# Patient Record
(demographics unavailable — no encounter records)

---

## 2024-12-26 NOTE — HISTORY OF PRESENT ILLNESS
[FreeTextEntry1] : --52 Y/O  HERE FOR CHECK UP. PMHX; denies SOCIAL;-ETOH   -CIGG       x STD;    HPV          DISCUSSED CONDOMS x partner recently dec- due to htn- stroke FAMILY HX OF BREAST CANCER,OVARIAN,UTERINE CA: mother /w breast ca age 67- being rx now REVIEW OF SYMPTOMS DONE ALLERGIES; Patient has answered amoxicillin - rash Medication reconciliation was completed by reviewing, with the patient's involvement, the patient's current outpatient medications and those ordered for the patient today. none  PE;BREASTS;- MASSES DC NODES SBE IMPLANTS B/L ABD SOFT NT ND NL GENIT   VAGINA -DC CX -CMT UTERUS 6 WEEKS  SIZE NT ADNEXA NT -MASSES'  A;P;CHECK UP -PAP -MRI BREASTS -F-U PRN.

## 2025-01-21 NOTE — ASSESSMENT
[FreeTextEntry1] : 1. Chronic leukopenia and neutropenia. 2. Mild anemia.  Assessment and Plan: -- CBC from today reviewed. WBC 4.07, ANC 1.38, Hgb 12.0, MCV 91.4, PLT 269K -- Long history of chronic leukopenia and neutropenia, most likely representing a benign process. She is asymptomatic and has no sign of frequent infection. Will continue observation. -- Continue taking oral iron supplement every other day. Reviewed iron studies from 03/2024 Iron sat 23%, iron 78, ferritin 45. -- Continue to take Vitamin B12 1000 mcg SL three times a week. -- Will order repeat blood work: CBC, Fe, TIBC, Ferritin, B12 and Folate -- Follow up with PCP, Dr. Plascencia for other healthcare issues. -- Follow up with Dr. Chow GYN as schd.  -- RTO for followup in 6 months with repeat labs: CBC, Fe, TIBC, Ferritin, B12 and Folate..  Pt seen with Dr. Gonsalez

## 2025-01-21 NOTE — PHYSICAL EXAM
[Fully active, able to carry on all pre-disease performance without restriction] : Status 0 - Fully active, able to carry on all pre-disease performance without restriction [Normal] : affect appropriate [de-identified] : bilateral breast implants

## 2025-01-21 NOTE — HISTORY OF PRESENT ILLNESS
[de-identified] : 49 yo female is referred for evaluation of chronic leukopenia and neutropenia. She was seen by me in the past for this condition. Extensive work up was done. BM biopsy did not reveal significant finding. TCR and Ig gene rearrangement did not show T-cell and B-cell clonality. Her Hb and PLT were normal. She was asymptomatic. She lost followup for a few years. On 11/15/19, she had blood work. CBC showed WBC 2.53, Hb 10.7 and , MCV 93.9. She has been on vegan diet for 3 years and feels well. She complains cramping in her legs sometimes.  [de-identified] : 5/6/2020 Patient is here today for follow up visit for h/o chronic leukopenia and neutropenia.  She has been taking Ferrous Sulfate few months ago.  %sat=14%, Ferritin=10. She is recently undergoing a separation during this COVID pandemic and stressed.  Her menses have been irregular.  These past two months, bleeding x 15 days.  However, her hgb improved to 12.0 from 10.8 last visit. She has an appt with her GYN, Dr. Harrison Chow on 5/12 for ultrasound and possible biopsy. FSH=3.5, LH=6.0 done 2/20/2020. Denies any fever, cough, SOB. She started adding eggs twice a week to her vegan diet.  10/1/2020 Patient is here today for follow up visit for h/o chronic leukopenia and neutropenia.  She has been taking Ferrous Sulfate daily but ran out 2 weeks ago.  %sat=9%, Ferritin=29 on 5/9/2020.  She saw her GYN, Dr. Chow for hysteroscopy which was benign. LMP 9/14/2020 irregular.   1/7/2021 Patient is here today for follow up visit for h/o chronic leukopenia and neutropenia.  She also has iron deficiency anemia due to heavy periods. She has been taking Ferrous Sulfate every other day.  She saw her GYN, Dr. Chow for hysteroscopy which was benign. LMP 11//2020 which has been irregular this past year. Last colonoscopy was done ~2 yrs ago by Dr. Arriola as per pt- polyps found.  Previous labs reviewed.  04/08/2021 Patient is here today for follow up visit for chronic leukopenia and neutropenia. She also has iron deficiency anemia due to heavy periods, now regular and moderate. She has been taking Ferrous Sulfate every other day. In addition she is taking vitamin B12 SL three days per week. She had COVID 1 month ago, still experiencing headache and chills. Denies fever, SOB, CP. CBC reviewed today WBC 4.18, ANC 2.34, H/H 11.3/35.6, . She had MRI bilateral breast 3/21 no evidence of malignancy.   7/8/21: Patient is here today for follow up visit for chronic leukopenia and neutropenia. She also has iron deficiency anemia due to heavy periods. She has been taking Ferrous Sulfate every other day. In addition she is taking vitamin B12 SL three days per week. She had COVID in 3/2021, and was symptomatic with headache, fatigue and chills. She is finally feeling better now. She has lost weight. Denies fever, mouth sore, SOB, n/v/d.   2/10/22: Patient is here today for follow up visit for chronic leukopenia and neutropenia. She also has iron deficiency anemia due to heavy periods. She has been taking Ferrous Sulfate every other day. In addition she is taking vitamin B12 SL three days per week. She had COVID in 3/2021, and was symptomatic with headache, fatigue and chills. She has recovered well now.   6/16/22 Sara is here today for follow up visit for chronic leukopenia and neutropenia. She also has iron deficiency anemia due to heavy periods. She has been taking Ferrous Sulfate every other day. In addition she is taking vitamin B12 SL three days per week. She had COVID in 3/2021, and was symptomatic with headache, fatigue and chills. She has recovered well now. She had COVID vaccin x 2.   9/22/22: Sara is here today for follow up visit for chronic leukopenia and neutropenia. She also has iron deficiency anemia due to heavy periods. She has been taking Ferrous Sulfate every other day. In addition she is taking vitamin B12 SL three days per week. She had COVID in 3/2021, and was symptomatic with headache, fatigue and chills. She has recovered well now. She had COVID vaccin x 2. She responded to vaccination and good spike protein Ab titers. She does not have new complains today. Her son was diagnosed with thyroid cancer and will have surgery. She is very concerned.  1/5/2023 Sara is here today for follow up visit for chronic leukopenia and neutropenia. She also has iron deficiency anemia due to heavy periods. She has been taking Ferrous Sulfate every other day. In addition she is taking vitamin B12 SL three days per week. She had COVID vaccine x 2. She responded to vaccination and good spike protein Ab titers. She does not have new complaints today. Denies fever, chills, night sweats, weight loss, fatigue, palpations.   04/06/23 Sara is here today for follow up visit for chronic leukopenia and neutropenia. She also has iron deficiency anemia due to heavy periods. She has been taking Ferrous Sulfate every other day. In addition she is taking vitamin B12 SL three days per week. She had COVID vaccine x 2. She responded to vaccination and good spike protein Ab titers. She does not have new complaints today.  07/13/2023 Sara is here today for follow up visit for chronic leukopenia and neutropenia. She also has iron deficiency anemia due to heavy periods. Pt does not have her period anymore. She has been taking Ferrous Sulfate every other day. In addition she is taking vitamin B12 SL three days per week. She had COVID vaccine x 2. She responded to vaccination and good spike protein Ab titers. She does not have new complaints today.  11/9/23 Sara is here today for follow up visit for chronic leukopenia and neutropenia. She also has iron deficiency anemia due to heavy periods. She no longer gets her period. She has been taking Ferrous Sulfate every other day. In addition she is taking vitamin B12 SL three days per week. She denies fever, chills, night sweats, weight loss.  3/14/24 Sara is here today for follow up visit for chronic leukopenia and neutropenia. She also has iron deficiency anemia due to heavy periods. She no longer gets her period. She has been taking Ferrous Sulfate every other day. In addition she is taking vitamin B12 SL three days per week. She denies fever, chills, night sweats, weight loss. She does endorse new right axilla nodule x 1 month, tender to palpation.   1/16/2025 Sara is here today for follow up visit for chronic leukopenia and neutropenia. She also has iron deficiency anemia due to heavy periods. Pt is now menopause LMP: 03/2022. She has been taking Ferrous Sulfate every other day. In addition, she is taking vitamin B12 SL three days per week. She denies fever, chills, night sweats, weight loss. She did endorse new right axilla nodule, which was tender to palpation 2/2024. s/p Breast MRI 3/2024 done by Dr. Chow benign per pt. Pt has a new script for breast MRI in 3/2025 Pt denies any new complaints today.

## 2025-01-21 NOTE — PHYSICAL EXAM
[Fully active, able to carry on all pre-disease performance without restriction] : Status 0 - Fully active, able to carry on all pre-disease performance without restriction [Normal] : affect appropriate [de-identified] : bilateral breast implants

## 2025-01-21 NOTE — HISTORY OF PRESENT ILLNESS
[de-identified] : 47 yo female is referred for evaluation of chronic leukopenia and neutropenia. She was seen by me in the past for this condition. Extensive work up was done. BM biopsy did not reveal significant finding. TCR and Ig gene rearrangement did not show T-cell and B-cell clonality. Her Hb and PLT were normal. She was asymptomatic. She lost followup for a few years. On 11/15/19, she had blood work. CBC showed WBC 2.53, Hb 10.7 and , MCV 93.9. She has been on vegan diet for 3 years and feels well. She complains cramping in her legs sometimes.  [de-identified] : 5/6/2020 Patient is here today for follow up visit for h/o chronic leukopenia and neutropenia.  She has been taking Ferrous Sulfate few months ago.  %sat=14%, Ferritin=10. She is recently undergoing a separation during this COVID pandemic and stressed.  Her menses have been irregular.  These past two months, bleeding x 15 days.  However, her hgb improved to 12.0 from 10.8 last visit. She has an appt with her GYN, Dr. Harrison Chow on 5/12 for ultrasound and possible biopsy. FSH=3.5, LH=6.0 done 2/20/2020. Denies any fever, cough, SOB. She started adding eggs twice a week to her vegan diet.  10/1/2020 Patient is here today for follow up visit for h/o chronic leukopenia and neutropenia.  She has been taking Ferrous Sulfate daily but ran out 2 weeks ago.  %sat=9%, Ferritin=29 on 5/9/2020.  She saw her GYN, Dr. Chow for hysteroscopy which was benign. LMP 9/14/2020 irregular.   1/7/2021 Patient is here today for follow up visit for h/o chronic leukopenia and neutropenia.  She also has iron deficiency anemia due to heavy periods. She has been taking Ferrous Sulfate every other day.  She saw her GYN, Dr. Chow for hysteroscopy which was benign. LMP 11//2020 which has been irregular this past year. Last colonoscopy was done ~2 yrs ago by Dr. Arriola as per pt- polyps found.  Previous labs reviewed.  04/08/2021 Patient is here today for follow up visit for chronic leukopenia and neutropenia. She also has iron deficiency anemia due to heavy periods, now regular and moderate. She has been taking Ferrous Sulfate every other day. In addition she is taking vitamin B12 SL three days per week. She had COVID 1 month ago, still experiencing headache and chills. Denies fever, SOB, CP. CBC reviewed today WBC 4.18, ANC 2.34, H/H 11.3/35.6, . She had MRI bilateral breast 3/21 no evidence of malignancy.   7/8/21: Patient is here today for follow up visit for chronic leukopenia and neutropenia. She also has iron deficiency anemia due to heavy periods. She has been taking Ferrous Sulfate every other day. In addition she is taking vitamin B12 SL three days per week. She had COVID in 3/2021, and was symptomatic with headache, fatigue and chills. She is finally feeling better now. She has lost weight. Denies fever, mouth sore, SOB, n/v/d.   2/10/22: Patient is here today for follow up visit for chronic leukopenia and neutropenia. She also has iron deficiency anemia due to heavy periods. She has been taking Ferrous Sulfate every other day. In addition she is taking vitamin B12 SL three days per week. She had COVID in 3/2021, and was symptomatic with headache, fatigue and chills. She has recovered well now.   6/16/22 Sara is here today for follow up visit for chronic leukopenia and neutropenia. She also has iron deficiency anemia due to heavy periods. She has been taking Ferrous Sulfate every other day. In addition she is taking vitamin B12 SL three days per week. She had COVID in 3/2021, and was symptomatic with headache, fatigue and chills. She has recovered well now. She had COVID vaccin x 2.   9/22/22: Sara is here today for follow up visit for chronic leukopenia and neutropenia. She also has iron deficiency anemia due to heavy periods. She has been taking Ferrous Sulfate every other day. In addition she is taking vitamin B12 SL three days per week. She had COVID in 3/2021, and was symptomatic with headache, fatigue and chills. She has recovered well now. She had COVID vaccin x 2. She responded to vaccination and good spike protein Ab titers. She does not have new complains today. Her son was diagnosed with thyroid cancer and will have surgery. She is very concerned.  1/5/2023 Sara is here today for follow up visit for chronic leukopenia and neutropenia. She also has iron deficiency anemia due to heavy periods. She has been taking Ferrous Sulfate every other day. In addition she is taking vitamin B12 SL three days per week. She had COVID vaccine x 2. She responded to vaccination and good spike protein Ab titers. She does not have new complaints today. Denies fever, chills, night sweats, weight loss, fatigue, palpations.   04/06/23 Sara is here today for follow up visit for chronic leukopenia and neutropenia. She also has iron deficiency anemia due to heavy periods. She has been taking Ferrous Sulfate every other day. In addition she is taking vitamin B12 SL three days per week. She had COVID vaccine x 2. She responded to vaccination and good spike protein Ab titers. She does not have new complaints today.  07/13/2023 Sara is here today for follow up visit for chronic leukopenia and neutropenia. She also has iron deficiency anemia due to heavy periods. Pt does not have her period anymore. She has been taking Ferrous Sulfate every other day. In addition she is taking vitamin B12 SL three days per week. She had COVID vaccine x 2. She responded to vaccination and good spike protein Ab titers. She does not have new complaints today.  11/9/23 Sara is here today for follow up visit for chronic leukopenia and neutropenia. She also has iron deficiency anemia due to heavy periods. She no longer gets her period. She has been taking Ferrous Sulfate every other day. In addition she is taking vitamin B12 SL three days per week. She denies fever, chills, night sweats, weight loss.  3/14/24 Sara is here today for follow up visit for chronic leukopenia and neutropenia. She also has iron deficiency anemia due to heavy periods. She no longer gets her period. She has been taking Ferrous Sulfate every other day. In addition she is taking vitamin B12 SL three days per week. She denies fever, chills, night sweats, weight loss. She does endorse new right axilla nodule x 1 month, tender to palpation.   1/16/2025 Sara is here today for follow up visit for chronic leukopenia and neutropenia. She also has iron deficiency anemia due to heavy periods. Pt is now menopause LMP: 03/2022. She has been taking Ferrous Sulfate every other day. In addition, she is taking vitamin B12 SL three days per week. She denies fever, chills, night sweats, weight loss. She did endorse new right axilla nodule, which was tender to palpation 2/2024. s/p Breast MRI 3/2024 done by Dr. Chow benign per pt. Pt has a new script for breast MRI in 3/2025 Pt denies any new complaints today.

## 2025-01-21 NOTE — PHYSICAL EXAM
[Fully active, able to carry on all pre-disease performance without restriction] : Status 0 - Fully active, able to carry on all pre-disease performance without restriction [Normal] : affect appropriate [de-identified] : bilateral breast implants

## 2025-01-21 NOTE — HISTORY OF PRESENT ILLNESS
[de-identified] : 47 yo female is referred for evaluation of chronic leukopenia and neutropenia. She was seen by me in the past for this condition. Extensive work up was done. BM biopsy did not reveal significant finding. TCR and Ig gene rearrangement did not show T-cell and B-cell clonality. Her Hb and PLT were normal. She was asymptomatic. She lost followup for a few years. On 11/15/19, she had blood work. CBC showed WBC 2.53, Hb 10.7 and , MCV 93.9. She has been on vegan diet for 3 years and feels well. She complains cramping in her legs sometimes.  [de-identified] : 5/6/2020 Patient is here today for follow up visit for h/o chronic leukopenia and neutropenia.  She has been taking Ferrous Sulfate few months ago.  %sat=14%, Ferritin=10. She is recently undergoing a separation during this COVID pandemic and stressed.  Her menses have been irregular.  These past two months, bleeding x 15 days.  However, her hgb improved to 12.0 from 10.8 last visit. She has an appt with her GYN, Dr. Harrison Chwo on 5/12 for ultrasound and possible biopsy. FSH=3.5, LH=6.0 done 2/20/2020. Denies any fever, cough, SOB. She started adding eggs twice a week to her vegan diet.  10/1/2020 Patient is here today for follow up visit for h/o chronic leukopenia and neutropenia.  She has been taking Ferrous Sulfate daily but ran out 2 weeks ago.  %sat=9%, Ferritin=29 on 5/9/2020.  She saw her GYN, Dr. Chow for hysteroscopy which was benign. LMP 9/14/2020 irregular.   1/7/2021 Patient is here today for follow up visit for h/o chronic leukopenia and neutropenia.  She also has iron deficiency anemia due to heavy periods. She has been taking Ferrous Sulfate every other day.  She saw her GYN, Dr. Chow for hysteroscopy which was benign. LMP 11//2020 which has been irregular this past year. Last colonoscopy was done ~2 yrs ago by Dr. Arriola as per pt- polyps found.  Previous labs reviewed.  04/08/2021 Patient is here today for follow up visit for chronic leukopenia and neutropenia. She also has iron deficiency anemia due to heavy periods, now regular and moderate. She has been taking Ferrous Sulfate every other day. In addition she is taking vitamin B12 SL three days per week. She had COVID 1 month ago, still experiencing headache and chills. Denies fever, SOB, CP. CBC reviewed today WBC 4.18, ANC 2.34, H/H 11.3/35.6, . She had MRI bilateral breast 3/21 no evidence of malignancy.   7/8/21: Patient is here today for follow up visit for chronic leukopenia and neutropenia. She also has iron deficiency anemia due to heavy periods. She has been taking Ferrous Sulfate every other day. In addition she is taking vitamin B12 SL three days per week. She had COVID in 3/2021, and was symptomatic with headache, fatigue and chills. She is finally feeling better now. She has lost weight. Denies fever, mouth sore, SOB, n/v/d.   2/10/22: Patient is here today for follow up visit for chronic leukopenia and neutropenia. She also has iron deficiency anemia due to heavy periods. She has been taking Ferrous Sulfate every other day. In addition she is taking vitamin B12 SL three days per week. She had COVID in 3/2021, and was symptomatic with headache, fatigue and chills. She has recovered well now.   6/16/22 Sara is here today for follow up visit for chronic leukopenia and neutropenia. She also has iron deficiency anemia due to heavy periods. She has been taking Ferrous Sulfate every other day. In addition she is taking vitamin B12 SL three days per week. She had COVID in 3/2021, and was symptomatic with headache, fatigue and chills. She has recovered well now. She had COVID vaccin x 2.   9/22/22: Sara is here today for follow up visit for chronic leukopenia and neutropenia. She also has iron deficiency anemia due to heavy periods. She has been taking Ferrous Sulfate every other day. In addition she is taking vitamin B12 SL three days per week. She had COVID in 3/2021, and was symptomatic with headache, fatigue and chills. She has recovered well now. She had COVID vaccin x 2. She responded to vaccination and good spike protein Ab titers. She does not have new complains today. Her son was diagnosed with thyroid cancer and will have surgery. She is very concerned.  1/5/2023 Sara is here today for follow up visit for chronic leukopenia and neutropenia. She also has iron deficiency anemia due to heavy periods. She has been taking Ferrous Sulfate every other day. In addition she is taking vitamin B12 SL three days per week. She had COVID vaccine x 2. She responded to vaccination and good spike protein Ab titers. She does not have new complaints today. Denies fever, chills, night sweats, weight loss, fatigue, palpations.   04/06/23 Sara is here today for follow up visit for chronic leukopenia and neutropenia. She also has iron deficiency anemia due to heavy periods. She has been taking Ferrous Sulfate every other day. In addition she is taking vitamin B12 SL three days per week. She had COVID vaccine x 2. She responded to vaccination and good spike protein Ab titers. She does not have new complaints today.  07/13/2023 Saar is here today for follow up visit for chronic leukopenia and neutropenia. She also has iron deficiency anemia due to heavy periods. Pt does not have her period anymore. She has been taking Ferrous Sulfate every other day. In addition she is taking vitamin B12 SL three days per week. She had COVID vaccine x 2. She responded to vaccination and good spike protein Ab titers. She does not have new complaints today.  11/9/23 Sara is here today for follow up visit for chronic leukopenia and neutropenia. She also has iron deficiency anemia due to heavy periods. She no longer gets her period. She has been taking Ferrous Sulfate every other day. In addition she is taking vitamin B12 SL three days per week. She denies fever, chills, night sweats, weight loss.  3/14/24 Sara is here today for follow up visit for chronic leukopenia and neutropenia. She also has iron deficiency anemia due to heavy periods. She no longer gets her period. She has been taking Ferrous Sulfate every other day. In addition she is taking vitamin B12 SL three days per week. She denies fever, chills, night sweats, weight loss. She does endorse new right axilla nodule x 1 month, tender to palpation.   1/16/2025 Sara is here today for follow up visit for chronic leukopenia and neutropenia. She also has iron deficiency anemia due to heavy periods. Pt is now menopause LMP: 03/2022. She has been taking Ferrous Sulfate every other day. In addition, she is taking vitamin B12 SL three days per week. She denies fever, chills, night sweats, weight loss. She did endorse new right axilla nodule, which was tender to palpation 2/2024. s/p Breast MRI 3/2024 done by Dr. Chow benign per pt. Pt has a new script for breast MRI in 3/2025 Pt denies any new complaints today.

## 2025-01-21 NOTE — PHYSICAL EXAM
[Fully active, able to carry on all pre-disease performance without restriction] : Status 0 - Fully active, able to carry on all pre-disease performance without restriction [Normal] : affect appropriate [de-identified] : bilateral breast implants

## 2025-01-21 NOTE — CONSULT LETTER
[Dear  ___] : Dear  [unfilled], [Courtesy Letter:] : I had the pleasure of seeing your patient, [unfilled], in my office today. [Please see my note below.] : Please see my note below. [Sincerely,] : Sincerely, [FreeTextEntry3] : Delisa Gonsalez MD

## 2025-01-21 NOTE — HISTORY OF PRESENT ILLNESS
[de-identified] : 47 yo female is referred for evaluation of chronic leukopenia and neutropenia. She was seen by me in the past for this condition. Extensive work up was done. BM biopsy did not reveal significant finding. TCR and Ig gene rearrangement did not show T-cell and B-cell clonality. Her Hb and PLT were normal. She was asymptomatic. She lost followup for a few years. On 11/15/19, she had blood work. CBC showed WBC 2.53, Hb 10.7 and , MCV 93.9. She has been on vegan diet for 3 years and feels well. She complains cramping in her legs sometimes.  [de-identified] : 5/6/2020 Patient is here today for follow up visit for h/o chronic leukopenia and neutropenia.  She has been taking Ferrous Sulfate few months ago.  %sat=14%, Ferritin=10. She is recently undergoing a separation during this COVID pandemic and stressed.  Her menses have been irregular.  These past two months, bleeding x 15 days.  However, her hgb improved to 12.0 from 10.8 last visit. She has an appt with her GYN, Dr. Harrison Chow on 5/12 for ultrasound and possible biopsy. FSH=3.5, LH=6.0 done 2/20/2020. Denies any fever, cough, SOB. She started adding eggs twice a week to her vegan diet.  10/1/2020 Patient is here today for follow up visit for h/o chronic leukopenia and neutropenia.  She has been taking Ferrous Sulfate daily but ran out 2 weeks ago.  %sat=9%, Ferritin=29 on 5/9/2020.  She saw her GYN, Dr. Chow for hysteroscopy which was benign. LMP 9/14/2020 irregular.   1/7/2021 Patient is here today for follow up visit for h/o chronic leukopenia and neutropenia.  She also has iron deficiency anemia due to heavy periods. She has been taking Ferrous Sulfate every other day.  She saw her GYN, Dr. Chow for hysteroscopy which was benign. LMP 11//2020 which has been irregular this past year. Last colonoscopy was done ~2 yrs ago by Dr. Arriola as per pt- polyps found.  Previous labs reviewed.  04/08/2021 Patient is here today for follow up visit for chronic leukopenia and neutropenia. She also has iron deficiency anemia due to heavy periods, now regular and moderate. She has been taking Ferrous Sulfate every other day. In addition she is taking vitamin B12 SL three days per week. She had COVID 1 month ago, still experiencing headache and chills. Denies fever, SOB, CP. CBC reviewed today WBC 4.18, ANC 2.34, H/H 11.3/35.6, . She had MRI bilateral breast 3/21 no evidence of malignancy.   7/8/21: Patient is here today for follow up visit for chronic leukopenia and neutropenia. She also has iron deficiency anemia due to heavy periods. She has been taking Ferrous Sulfate every other day. In addition she is taking vitamin B12 SL three days per week. She had COVID in 3/2021, and was symptomatic with headache, fatigue and chills. She is finally feeling better now. She has lost weight. Denies fever, mouth sore, SOB, n/v/d.   2/10/22: Patient is here today for follow up visit for chronic leukopenia and neutropenia. She also has iron deficiency anemia due to heavy periods. She has been taking Ferrous Sulfate every other day. In addition she is taking vitamin B12 SL three days per week. She had COVID in 3/2021, and was symptomatic with headache, fatigue and chills. She has recovered well now.   6/16/22 Sara is here today for follow up visit for chronic leukopenia and neutropenia. She also has iron deficiency anemia due to heavy periods. She has been taking Ferrous Sulfate every other day. In addition she is taking vitamin B12 SL three days per week. She had COVID in 3/2021, and was symptomatic with headache, fatigue and chills. She has recovered well now. She had COVID vaccin x 2.   9/22/22: Sara is here today for follow up visit for chronic leukopenia and neutropenia. She also has iron deficiency anemia due to heavy periods. She has been taking Ferrous Sulfate every other day. In addition she is taking vitamin B12 SL three days per week. She had COVID in 3/2021, and was symptomatic with headache, fatigue and chills. She has recovered well now. She had COVID vaccin x 2. She responded to vaccination and good spike protein Ab titers. She does not have new complains today. Her son was diagnosed with thyroid cancer and will have surgery. She is very concerned.  1/5/2023 Sara is here today for follow up visit for chronic leukopenia and neutropenia. She also has iron deficiency anemia due to heavy periods. She has been taking Ferrous Sulfate every other day. In addition she is taking vitamin B12 SL three days per week. She had COVID vaccine x 2. She responded to vaccination and good spike protein Ab titers. She does not have new complaints today. Denies fever, chills, night sweats, weight loss, fatigue, palpations.   04/06/23 Sara is here today for follow up visit for chronic leukopenia and neutropenia. She also has iron deficiency anemia due to heavy periods. She has been taking Ferrous Sulfate every other day. In addition she is taking vitamin B12 SL three days per week. She had COVID vaccine x 2. She responded to vaccination and good spike protein Ab titers. She does not have new complaints today.  07/13/2023 Sara is here today for follow up visit for chronic leukopenia and neutropenia. She also has iron deficiency anemia due to heavy periods. Pt does not have her period anymore. She has been taking Ferrous Sulfate every other day. In addition she is taking vitamin B12 SL three days per week. She had COVID vaccine x 2. She responded to vaccination and good spike protein Ab titers. She does not have new complaints today.  11/9/23 Sara is here today for follow up visit for chronic leukopenia and neutropenia. She also has iron deficiency anemia due to heavy periods. She no longer gets her period. She has been taking Ferrous Sulfate every other day. In addition she is taking vitamin B12 SL three days per week. She denies fever, chills, night sweats, weight loss.  3/14/24 Sara is here today for follow up visit for chronic leukopenia and neutropenia. She also has iron deficiency anemia due to heavy periods. She no longer gets her period. She has been taking Ferrous Sulfate every other day. In addition she is taking vitamin B12 SL three days per week. She denies fever, chills, night sweats, weight loss. She does endorse new right axilla nodule x 1 month, tender to palpation.   1/16/2025 Sara is here today for follow up visit for chronic leukopenia and neutropenia. She also has iron deficiency anemia due to heavy periods. Pt is now menopause LMP: 03/2022. She has been taking Ferrous Sulfate every other day. In addition, she is taking vitamin B12 SL three days per week. She denies fever, chills, night sweats, weight loss. She did endorse new right axilla nodule, which was tender to palpation 2/2024. s/p Breast MRI 3/2024 done by Dr. Chow benign per pt. Pt has a new script for breast MRI in 3/2025 Pt denies any new complaints today.

## 2025-05-15 NOTE — HISTORY OF PRESENT ILLNESS
[FreeTextEntry1] : --52 Y/O  HERE C/O BREAST DISCOMFORT;PT HAD NL MRI OF THE BREAST. WE DISCUSSED MRI VS [facility] AND DX TESTS TO CHECK FOR BREAST HEALTH. PMHX; denies SOCIAL;-ETOH   -CIGG       x STD;    HPV          DISCUSSED CONDOMS x partner recently dec- due to htn- stroke FAMILY HX OF BREAST CANCER,OVARIAN,UTERINE CA: mother /w breast ca age 67- being rx now REVIEW OF SYMPTOMS DONE ALLERGIES; Patient has answered amoxicillin - rash Medication reconciliation was completed by reviewing, with the patient's involvement, the patient's current outpatient medications and those ordered for the patient today. none  PE; ABD SOFT NT ND EXT -CCE  A;P;BREAST DISCOMFORT;NL MRI -REASSURED PT -ANSWERED QUESTIONS

## 2025-07-17 NOTE — PHYSICAL EXAM
[Fully active, able to carry on all pre-disease performance without restriction] : Status 0 - Fully active, able to carry on all pre-disease performance without restriction [Normal] : affect appropriate [de-identified] : bilateral breast implants

## 2025-07-17 NOTE — ASSESSMENT
[FreeTextEntry1] : 1. Chronic leukopenia and neutropenia. 2. Mild anemia.  Assessment and Plan: -- CBC from today reviewed. Blood counts are improving.  -- Long history of chronic leukopenia and neutropenia, most likely representing a benign process. She is asymptomatic and has no sign of frequent infection. Will continue observation. -- Continue taking oral iron supplement every other day. -- Continue to take Vitamin B12 1000 mcg SL three times a week. -- Will order repeat blood work: CBC, Fe, TIBC, Ferritin, B12 and Folate -- Follow up with PCP, Dr. Plascencia for other healthcare issues. -- RTO for followup in 6 months with repeat labs.

## 2025-07-17 NOTE — HISTORY OF PRESENT ILLNESS
[de-identified] : 49 yo female is referred for evaluation of chronic leukopenia and neutropenia. She was seen by me in the past for this condition. Extensive work up was done. BM biopsy did not reveal significant finding. TCR and Ig gene rearrangement did not show T-cell and B-cell clonality. Her Hb and PLT were normal. She was asymptomatic. She lost followup for a few years. On 11/15/19, she had blood work. CBC showed WBC 2.53, Hb 10.7 and , MCV 93.9. She has been on vegan diet for 3 years and feels well. She complains cramping in her legs sometimes.  [de-identified] : 5/6/2020 Patient is here today for follow up visit for h/o chronic leukopenia and neutropenia.  She has been taking Ferrous Sulfate few months ago.  %sat=14%, Ferritin=10. She is recently undergoing a separation during this COVID pandemic and stressed.  Her menses have been irregular.  These past two months, bleeding x 15 days.  However, her hgb improved to 12.0 from 10.8 last visit. She has an appt with her GYN, Dr. Harrison Chow on 5/12 for ultrasound and possible biopsy. FSH=3.5, LH=6.0 done 2/20/2020. Denies any fever, cough, SOB. She started adding eggs twice a week to her vegan diet.  10/1/2020 Patient is here today for follow up visit for h/o chronic leukopenia and neutropenia.  She has been taking Ferrous Sulfate daily but ran out 2 weeks ago.  %sat=9%, Ferritin=29 on 5/9/2020.  She saw her GYN, Dr. Chow for hysteroscopy which was benign. LMP 9/14/2020 irregular.   1/7/2021 Patient is here today for follow up visit for h/o chronic leukopenia and neutropenia.  She also has iron deficiency anemia due to heavy periods. She has been taking Ferrous Sulfate every other day.  She saw her GYN, Dr. Chow for hysteroscopy which was benign. LMP 11//2020 which has been irregular this past year. Last colonoscopy was done ~2 yrs ago by Dr. Arriola as per pt- polyps found.  Previous labs reviewed.  04/08/2021 Patient is here today for follow up visit for chronic leukopenia and neutropenia. She also has iron deficiency anemia due to heavy periods, now regular and moderate. She has been taking Ferrous Sulfate every other day. In addition she is taking vitamin B12 SL three days per week. She had COVID 1 month ago, still experiencing headache and chills. Denies fever, SOB, CP. CBC reviewed today WBC 4.18, ANC 2.34, H/H 11.3/35.6, . She had MRI bilateral breast 3/21 no evidence of malignancy.   7/8/21: Patient is here today for follow up visit for chronic leukopenia and neutropenia. She also has iron deficiency anemia due to heavy periods. She has been taking Ferrous Sulfate every other day. In addition she is taking vitamin B12 SL three days per week. She had COVID in 3/2021, and was symptomatic with headache, fatigue and chills. She is finally feeling better now. She has lost weight. Denies fever, mouth sore, SOB, n/v/d.   2/10/22: Patient is here today for follow up visit for chronic leukopenia and neutropenia. She also has iron deficiency anemia due to heavy periods. She has been taking Ferrous Sulfate every other day. In addition she is taking vitamin B12 SL three days per week. She had COVID in 3/2021, and was symptomatic with headache, fatigue and chills. She has recovered well now.   6/16/22 Sara is here today for follow up visit for chronic leukopenia and neutropenia. She also has iron deficiency anemia due to heavy periods. She has been taking Ferrous Sulfate every other day. In addition she is taking vitamin B12 SL three days per week. She had COVID in 3/2021, and was symptomatic with headache, fatigue and chills. She has recovered well now. She had COVID vaccin x 2.   9/22/22: Sara is here today for follow up visit for chronic leukopenia and neutropenia. She also has iron deficiency anemia due to heavy periods. She has been taking Ferrous Sulfate every other day. In addition she is taking vitamin B12 SL three days per week. She had COVID in 3/2021, and was symptomatic with headache, fatigue and chills. She has recovered well now. She had COVID vaccin x 2. She responded to vaccination and good spike protein Ab titers. She does not have new complains today. Her son was diagnosed with thyroid cancer and will have surgery. She is very concerned.  1/5/2023 Sara is here today for follow up visit for chronic leukopenia and neutropenia. She also has iron deficiency anemia due to heavy periods. She has been taking Ferrous Sulfate every other day. In addition she is taking vitamin B12 SL three days per week. She had COVID vaccine x 2. She responded to vaccination and good spike protein Ab titers. She does not have new complaints today. Denies fever, chills, night sweats, weight loss, fatigue, palpations.   04/06/23 Sara is here today for follow up visit for chronic leukopenia and neutropenia. She also has iron deficiency anemia due to heavy periods. She has been taking Ferrous Sulfate every other day. In addition she is taking vitamin B12 SL three days per week. She had COVID vaccine x 2. She responded to vaccination and good spike protein Ab titers. She does not have new complaints today.  07/13/2023 Sara is here today for follow up visit for chronic leukopenia and neutropenia. She also has iron deficiency anemia due to heavy periods. Pt does not have her period anymore. She has been taking Ferrous Sulfate every other day. In addition she is taking vitamin B12 SL three days per week. She had COVID vaccine x 2. She responded to vaccination and good spike protein Ab titers. She does not have new complaints today.  11/9/23 Sara is here today for follow up visit for chronic leukopenia and neutropenia. She also has iron deficiency anemia due to heavy periods. She no longer gets her period. She has been taking Ferrous Sulfate every other day. In addition she is taking vitamin B12 SL three days per week. She denies fever, chills, night sweats, weight loss.  3/14/24 Sara is here today for follow up visit for chronic leukopenia and neutropenia. She also has iron deficiency anemia due to heavy periods. She no longer gets her period. She has been taking Ferrous Sulfate every other day. In addition she is taking vitamin B12 SL three days per week. She denies fever, chills, night sweats, weight loss. She does endorse new right axilla nodule x 1 month, tender to palpation.   1/16/2025 Sara is here today for follow up visit for chronic leukopenia and neutropenia. She also has iron deficiency anemia due to heavy periods. Pt is now menopause LMP: 03/2022. She has been taking Ferrous Sulfate every other day. In addition, she is taking vitamin B12 SL three days per week. She denies fever, chills, night sweats, weight loss. She did endorse new right axilla nodule, which was tender to palpation 2/2024. s/p Breast MRI 3/2024 done by Dr. Chow benign per pt. Pt has a new script for breast MRI in 3/2025 Pt denies any new complaints today.  7/17/25 Sara is here today for follow up visit for chronic leukopenia and neutropenia. She also has iron deficiency anemia due to heavy periods. Pt is now menopause LMP: 03/2022. She has been taking Ferrous Sulfate every other day. In addition, she is taking vitamin B12 SL three days per week. She is feeling well.

## 2025-07-17 NOTE — HISTORY OF PRESENT ILLNESS
[de-identified] : 49 yo female is referred for evaluation of chronic leukopenia and neutropenia. She was seen by me in the past for this condition. Extensive work up was done. BM biopsy did not reveal significant finding. TCR and Ig gene rearrangement did not show T-cell and B-cell clonality. Her Hb and PLT were normal. She was asymptomatic. She lost followup for a few years. On 11/15/19, she had blood work. CBC showed WBC 2.53, Hb 10.7 and , MCV 93.9. She has been on vegan diet for 3 years and feels well. She complains cramping in her legs sometimes.  [de-identified] : 5/6/2020 Patient is here today for follow up visit for h/o chronic leukopenia and neutropenia.  She has been taking Ferrous Sulfate few months ago.  %sat=14%, Ferritin=10. She is recently undergoing a separation during this COVID pandemic and stressed.  Her menses have been irregular.  These past two months, bleeding x 15 days.  However, her hgb improved to 12.0 from 10.8 last visit. She has an appt with her GYN, Dr. Harrison Chow on 5/12 for ultrasound and possible biopsy. FSH=3.5, LH=6.0 done 2/20/2020. Denies any fever, cough, SOB. She started adding eggs twice a week to her vegan diet.  10/1/2020 Patient is here today for follow up visit for h/o chronic leukopenia and neutropenia.  She has been taking Ferrous Sulfate daily but ran out 2 weeks ago.  %sat=9%, Ferritin=29 on 5/9/2020.  She saw her GYN, Dr. Chow for hysteroscopy which was benign. LMP 9/14/2020 irregular.   1/7/2021 Patient is here today for follow up visit for h/o chronic leukopenia and neutropenia.  She also has iron deficiency anemia due to heavy periods. She has been taking Ferrous Sulfate every other day.  She saw her GYN, Dr. Chow for hysteroscopy which was benign. LMP 11//2020 which has been irregular this past year. Last colonoscopy was done ~2 yrs ago by Dr. Arriola as per pt- polyps found.  Previous labs reviewed.  04/08/2021 Patient is here today for follow up visit for chronic leukopenia and neutropenia. She also has iron deficiency anemia due to heavy periods, now regular and moderate. She has been taking Ferrous Sulfate every other day. In addition she is taking vitamin B12 SL three days per week. She had COVID 1 month ago, still experiencing headache and chills. Denies fever, SOB, CP. CBC reviewed today WBC 4.18, ANC 2.34, H/H 11.3/35.6, . She had MRI bilateral breast 3/21 no evidence of malignancy.   7/8/21: Patient is here today for follow up visit for chronic leukopenia and neutropenia. She also has iron deficiency anemia due to heavy periods. She has been taking Ferrous Sulfate every other day. In addition she is taking vitamin B12 SL three days per week. She had COVID in 3/2021, and was symptomatic with headache, fatigue and chills. She is finally feeling better now. She has lost weight. Denies fever, mouth sore, SOB, n/v/d.   2/10/22: Patient is here today for follow up visit for chronic leukopenia and neutropenia. She also has iron deficiency anemia due to heavy periods. She has been taking Ferrous Sulfate every other day. In addition she is taking vitamin B12 SL three days per week. She had COVID in 3/2021, and was symptomatic with headache, fatigue and chills. She has recovered well now.   6/16/22 Sara is here today for follow up visit for chronic leukopenia and neutropenia. She also has iron deficiency anemia due to heavy periods. She has been taking Ferrous Sulfate every other day. In addition she is taking vitamin B12 SL three days per week. She had COVID in 3/2021, and was symptomatic with headache, fatigue and chills. She has recovered well now. She had COVID vaccin x 2.   9/22/22: Sara is here today for follow up visit for chronic leukopenia and neutropenia. She also has iron deficiency anemia due to heavy periods. She has been taking Ferrous Sulfate every other day. In addition she is taking vitamin B12 SL three days per week. She had COVID in 3/2021, and was symptomatic with headache, fatigue and chills. She has recovered well now. She had COVID vaccin x 2. She responded to vaccination and good spike protein Ab titers. She does not have new complains today. Her son was diagnosed with thyroid cancer and will have surgery. She is very concerned.  1/5/2023 Sara is here today for follow up visit for chronic leukopenia and neutropenia. She also has iron deficiency anemia due to heavy periods. She has been taking Ferrous Sulfate every other day. In addition she is taking vitamin B12 SL three days per week. She had COVID vaccine x 2. She responded to vaccination and good spike protein Ab titers. She does not have new complaints today. Denies fever, chills, night sweats, weight loss, fatigue, palpations.   04/06/23 Sara is here today for follow up visit for chronic leukopenia and neutropenia. She also has iron deficiency anemia due to heavy periods. She has been taking Ferrous Sulfate every other day. In addition she is taking vitamin B12 SL three days per week. She had COVID vaccine x 2. She responded to vaccination and good spike protein Ab titers. She does not have new complaints today.  07/13/2023 Sara is here today for follow up visit for chronic leukopenia and neutropenia. She also has iron deficiency anemia due to heavy periods. Pt does not have her period anymore. She has been taking Ferrous Sulfate every other day. In addition she is taking vitamin B12 SL three days per week. She had COVID vaccine x 2. She responded to vaccination and good spike protein Ab titers. She does not have new complaints today.  11/9/23 Sara is here today for follow up visit for chronic leukopenia and neutropenia. She also has iron deficiency anemia due to heavy periods. She no longer gets her period. She has been taking Ferrous Sulfate every other day. In addition she is taking vitamin B12 SL three days per week. She denies fever, chills, night sweats, weight loss.  3/14/24 Sara is here today for follow up visit for chronic leukopenia and neutropenia. She also has iron deficiency anemia due to heavy periods. She no longer gets her period. She has been taking Ferrous Sulfate every other day. In addition she is taking vitamin B12 SL three days per week. She denies fever, chills, night sweats, weight loss. She does endorse new right axilla nodule x 1 month, tender to palpation.   1/16/2025 Sara is here today for follow up visit for chronic leukopenia and neutropenia. She also has iron deficiency anemia due to heavy periods. Pt is now menopause LMP: 03/2022. She has been taking Ferrous Sulfate every other day. In addition, she is taking vitamin B12 SL three days per week. She denies fever, chills, night sweats, weight loss. She did endorse new right axilla nodule, which was tender to palpation 2/2024. s/p Breast MRI 3/2024 done by Dr. Chow benign per pt. Pt has a new script for breast MRI in 3/2025 Pt denies any new complaints today.  7/17/25 Sara is here today for follow up visit for chronic leukopenia and neutropenia. She also has iron deficiency anemia due to heavy periods. Pt is now menopause LMP: 03/2022. She has been taking Ferrous Sulfate every other day. In addition, she is taking vitamin B12 SL three days per week. She is feeling well.

## 2025-07-17 NOTE — PHYSICAL EXAM
[Fully active, able to carry on all pre-disease performance without restriction] : Status 0 - Fully active, able to carry on all pre-disease performance without restriction [Normal] : affect appropriate [de-identified] : bilateral breast implants